# Patient Record
Sex: MALE | Race: OTHER | URBAN - METROPOLITAN AREA
[De-identification: names, ages, dates, MRNs, and addresses within clinical notes are randomized per-mention and may not be internally consistent; named-entity substitution may affect disease eponyms.]

---
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
## 2024-05-14 NOTE — PROGRESS NOTES
3/10/2018  [facility][1]    Subjective: Aruna Finnegan is a 13 y.o. male. Chief Complaint   Patient presents with    Follow-up       HPI:   Franco Carlos is a 13 y.o. male who presents with mother for follow-up. Discussed weight (BMI 99%)  Pt with family stressors  Gained 4lbs  Not able to make appointment with nutritionist, will attempt to reschedule      No Known Allergies  Past Medical History:   Diagnosis Date    History of varicella 2007      reports that he has never smoked. He has never used smokeless tobacco. He reports that he does not drink alcohol or use illicit drugs. Review of Systems:   A comprehensive review of systems was negative except for that written in the HPI. Objective:     Visit Vitals    /78 (BP 1 Location: Left arm, BP Patient Position: Sitting)    Pulse 88    Temp 98.1 °F (36.7 °C) (Oral)    Wt 250 lb (113.4 kg)       Physical Exam:  General  no distress, well developed, well nourished, obese  HEENT  oropharynx clear and moist mucous membranes  Eyes  EOMI and Conjunctivae Clear Bilaterally  Respiratory  Clear Breath Sounds Bilaterally and Good Air Movement Bilaterally  Cardiovascular   RRR, S1S2 and No murmur  Abdomen  soft, non tender and active bowel sounds  Skin  No Rash  Musculoskeletal full range of motion in all Joints  Neurology  AAO        Assessment / Plan:       ICD-10-CM ICD-9-CM    1. BMI (body mass index), pediatric, 95-99% for age Z71.50 V80.51      Encounter Diagnoses   Name Primary?  BMI (body mass index), pediatric, 95-99% for age Yes       Follow-up Disposition:  Return in about 4 months (around 7/8/2018).   Anticipatory guidance given- handout and reviewed  Expressed understanding; used     Kamran Reagan MD No risk alerts present